# Patient Record
Sex: MALE | HISPANIC OR LATINO
[De-identification: names, ages, dates, MRNs, and addresses within clinical notes are randomized per-mention and may not be internally consistent; named-entity substitution may affect disease eponyms.]

---

## 2019-08-09 ENCOUNTER — TRANSCRIPTION ENCOUNTER (OUTPATIENT)
Age: 34
End: 2019-08-09

## 2019-12-14 ENCOUNTER — EMERGENCY (EMERGENCY)
Facility: HOSPITAL | Age: 34
LOS: 0 days | Discharge: HOME | End: 2019-12-14
Attending: EMERGENCY MEDICINE
Payer: SELF-PAY

## 2019-12-14 VITALS
SYSTOLIC BLOOD PRESSURE: 120 MMHG | TEMPERATURE: 98 F | OXYGEN SATURATION: 100 % | DIASTOLIC BLOOD PRESSURE: 60 MMHG | HEART RATE: 62 BPM | RESPIRATION RATE: 18 BRPM

## 2019-12-14 VITALS — WEIGHT: 160.06 LBS | HEIGHT: 70 IN

## 2019-12-14 DIAGNOSIS — Z41.9 ENCOUNTER FOR PROCEDURE FOR PURPOSES OTHER THAN REMEDYING HEALTH STATE, UNSPECIFIED: Chronic | ICD-10-CM

## 2019-12-14 DIAGNOSIS — K29.00 ACUTE GASTRITIS WITHOUT BLEEDING: ICD-10-CM

## 2019-12-14 DIAGNOSIS — R10.10 UPPER ABDOMINAL PAIN, UNSPECIFIED: ICD-10-CM

## 2019-12-14 DIAGNOSIS — R11.2 NAUSEA WITH VOMITING, UNSPECIFIED: ICD-10-CM

## 2019-12-14 DIAGNOSIS — Z98.89 OTHER SPECIFIED POSTPROCEDURAL STATES: Chronic | ICD-10-CM

## 2019-12-14 LAB
ALBUMIN SERPL ELPH-MCNC: 4.1 G/DL — SIGNIFICANT CHANGE UP (ref 3.3–5)
ALP SERPL-CCNC: 98 U/L — SIGNIFICANT CHANGE UP (ref 40–120)
ALT FLD-CCNC: 34 U/L — SIGNIFICANT CHANGE UP (ref 12–78)
ANION GAP SERPL CALC-SCNC: 4 MMOL/L — LOW (ref 5–17)
APPEARANCE UR: CLEAR — SIGNIFICANT CHANGE UP
AST SERPL-CCNC: 36 U/L — SIGNIFICANT CHANGE UP (ref 15–37)
BASOPHILS # BLD AUTO: 0.06 K/UL — SIGNIFICANT CHANGE UP (ref 0–0.2)
BASOPHILS NFR BLD AUTO: 1 % — SIGNIFICANT CHANGE UP (ref 0–2)
BILIRUB SERPL-MCNC: 0.6 MG/DL — SIGNIFICANT CHANGE UP (ref 0.2–1.2)
BILIRUB UR-MCNC: NEGATIVE — SIGNIFICANT CHANGE UP
BUN SERPL-MCNC: 13 MG/DL — SIGNIFICANT CHANGE UP (ref 7–23)
CALCIUM SERPL-MCNC: 9.3 MG/DL — SIGNIFICANT CHANGE UP (ref 8.5–10.1)
CHLORIDE SERPL-SCNC: 110 MMOL/L — HIGH (ref 96–108)
CO2 SERPL-SCNC: 26 MMOL/L — SIGNIFICANT CHANGE UP (ref 22–31)
COLOR SPEC: YELLOW — SIGNIFICANT CHANGE UP
CREAT SERPL-MCNC: 1.17 MG/DL — SIGNIFICANT CHANGE UP (ref 0.5–1.3)
DIFF PNL FLD: NEGATIVE — SIGNIFICANT CHANGE UP
EOSINOPHIL # BLD AUTO: 0.09 K/UL — SIGNIFICANT CHANGE UP (ref 0–0.5)
EOSINOPHIL NFR BLD AUTO: 1.5 % — SIGNIFICANT CHANGE UP (ref 0–6)
GLUCOSE SERPL-MCNC: 103 MG/DL — HIGH (ref 70–99)
GLUCOSE UR QL: NEGATIVE MG/DL — SIGNIFICANT CHANGE UP
HCT VFR BLD CALC: 46.9 % — SIGNIFICANT CHANGE UP (ref 39–50)
HGB BLD-MCNC: 16.2 G/DL — SIGNIFICANT CHANGE UP (ref 13–17)
IMM GRANULOCYTES NFR BLD AUTO: 0.2 % — SIGNIFICANT CHANGE UP (ref 0–1.5)
KETONES UR-MCNC: NEGATIVE — SIGNIFICANT CHANGE UP
LEUKOCYTE ESTERASE UR-ACNC: NEGATIVE — SIGNIFICANT CHANGE UP
LIDOCAIN IGE QN: 192 U/L — SIGNIFICANT CHANGE UP (ref 73–393)
LYMPHOCYTES # BLD AUTO: 2.5 K/UL — SIGNIFICANT CHANGE UP (ref 1–3.3)
LYMPHOCYTES # BLD AUTO: 41.7 % — SIGNIFICANT CHANGE UP (ref 13–44)
MCHC RBC-ENTMCNC: 29.6 PG — SIGNIFICANT CHANGE UP (ref 27–34)
MCHC RBC-ENTMCNC: 34.5 GM/DL — SIGNIFICANT CHANGE UP (ref 32–36)
MCV RBC AUTO: 85.7 FL — SIGNIFICANT CHANGE UP (ref 80–100)
MONOCYTES # BLD AUTO: 0.48 K/UL — SIGNIFICANT CHANGE UP (ref 0–0.9)
MONOCYTES NFR BLD AUTO: 8 % — SIGNIFICANT CHANGE UP (ref 2–14)
NEUTROPHILS # BLD AUTO: 2.86 K/UL — SIGNIFICANT CHANGE UP (ref 1.8–7.4)
NEUTROPHILS NFR BLD AUTO: 47.6 % — SIGNIFICANT CHANGE UP (ref 43–77)
NITRITE UR-MCNC: NEGATIVE — SIGNIFICANT CHANGE UP
PH UR: 6 — SIGNIFICANT CHANGE UP (ref 5–8)
PLATELET # BLD AUTO: 225 K/UL — SIGNIFICANT CHANGE UP (ref 150–400)
POTASSIUM SERPL-MCNC: 4 MMOL/L — SIGNIFICANT CHANGE UP (ref 3.5–5.3)
POTASSIUM SERPL-SCNC: 4 MMOL/L — SIGNIFICANT CHANGE UP (ref 3.5–5.3)
PROT SERPL-MCNC: 7.4 GM/DL — SIGNIFICANT CHANGE UP (ref 6–8.3)
PROT UR-MCNC: NEGATIVE MG/DL — SIGNIFICANT CHANGE UP
RBC # BLD: 5.47 M/UL — SIGNIFICANT CHANGE UP (ref 4.2–5.8)
RBC # FLD: 12.8 % — SIGNIFICANT CHANGE UP (ref 10.3–14.5)
SODIUM SERPL-SCNC: 140 MMOL/L — SIGNIFICANT CHANGE UP (ref 135–145)
SP GR SPEC: 1.01 — SIGNIFICANT CHANGE UP (ref 1.01–1.02)
UROBILINOGEN FLD QL: NEGATIVE MG/DL — SIGNIFICANT CHANGE UP
WBC # BLD: 6 K/UL — SIGNIFICANT CHANGE UP (ref 3.8–10.5)
WBC # FLD AUTO: 6 K/UL — SIGNIFICANT CHANGE UP (ref 3.8–10.5)

## 2019-12-14 PROCEDURE — 74177 CT ABD & PELVIS W/CONTRAST: CPT

## 2019-12-14 PROCEDURE — 83690 ASSAY OF LIPASE: CPT

## 2019-12-14 PROCEDURE — 81003 URINALYSIS AUTO W/O SCOPE: CPT

## 2019-12-14 PROCEDURE — 99284 EMERGENCY DEPT VISIT MOD MDM: CPT

## 2019-12-14 PROCEDURE — 96375 TX/PRO/DX INJ NEW DRUG ADDON: CPT

## 2019-12-14 PROCEDURE — 74177 CT ABD & PELVIS W/CONTRAST: CPT | Mod: 26

## 2019-12-14 PROCEDURE — 96374 THER/PROPH/DIAG INJ IV PUSH: CPT | Mod: XU

## 2019-12-14 PROCEDURE — 76705 ECHO EXAM OF ABDOMEN: CPT

## 2019-12-14 PROCEDURE — 80053 COMPREHEN METABOLIC PANEL: CPT

## 2019-12-14 PROCEDURE — 85025 COMPLETE CBC W/AUTO DIFF WBC: CPT

## 2019-12-14 PROCEDURE — 99053 MED SERV 10PM-8AM 24 HR FAC: CPT

## 2019-12-14 PROCEDURE — 76705 ECHO EXAM OF ABDOMEN: CPT | Mod: 26

## 2019-12-14 PROCEDURE — 99284 EMERGENCY DEPT VISIT MOD MDM: CPT | Mod: 25

## 2019-12-14 PROCEDURE — 36415 COLL VENOUS BLD VENIPUNCTURE: CPT

## 2019-12-14 RX ORDER — ONDANSETRON 8 MG/1
4 TABLET, FILM COATED ORAL ONCE
Refills: 0 | Status: COMPLETED | OUTPATIENT
Start: 2019-12-14 | End: 2019-12-14

## 2019-12-14 RX ORDER — FAMOTIDINE 10 MG/ML
20 INJECTION INTRAVENOUS ONCE
Refills: 0 | Status: COMPLETED | OUTPATIENT
Start: 2019-12-14 | End: 2019-12-14

## 2019-12-14 RX ORDER — MORPHINE SULFATE 50 MG/1
4 CAPSULE, EXTENDED RELEASE ORAL ONCE
Refills: 0 | Status: DISCONTINUED | OUTPATIENT
Start: 2019-12-14 | End: 2019-12-14

## 2019-12-14 RX ADMIN — ONDANSETRON 4 MILLIGRAM(S): 8 TABLET, FILM COATED ORAL at 01:28

## 2019-12-14 RX ADMIN — MORPHINE SULFATE 4 MILLIGRAM(S): 50 CAPSULE, EXTENDED RELEASE ORAL at 01:28

## 2019-12-14 RX ADMIN — FAMOTIDINE 20 MILLIGRAM(S): 10 INJECTION INTRAVENOUS at 01:28

## 2019-12-14 NOTE — ED PROVIDER NOTE - NSFOLLOWUPCLINICS_GEN_ALL_ED_FT
A Gastroenterologist  Gastroenterology  .  [place]   Phone:   Fax:   Follow Up Time:     [facility]  [address] [place] [address]  Phone: [phone]  Fax:   Follow Up Time:

## 2019-12-14 NOTE — ED PROVIDER NOTE - PSH
Elective surgery  Two previous eye surgeries for right eye trama , and right eye infection.  S/P appy

## 2019-12-14 NOTE — ED PROVIDER NOTE - PATIENT PORTAL LINK FT
You can access the FollowMyHealth Patient Portal offered by [facility] by registering at the following website: [email or web address]. By joining [facility]’s FollowMyHealth portal, you will also be able to view your health information using other applications (apps) compatible with our system.

## 2019-12-14 NOTE — ED ADULT NURSE NOTE - OBJECTIVE STATEMENT
35 y/o male awake alert and oriented x4 presents to ED c/o generalized abd pain x 1 week. + nausea. Denies vomiting, diarrhea, fever/chills, urinary sx, recent travel or sick contact. Took tylenol with mild relief.

## 2019-12-14 NOTE — ED PROVIDER NOTE - NSFOLLOWUPINSTRUCTIONS_ED_ALL_ED_FT
Bedside and Verbal shift change report given to Allen Modi. Report included the following information SBAR, Kardex, MAR, Accordion and Recent Results. Log Out.    [employer]® CareNotes®     :  [facility]             GASTRITIS - General Information     Gastritis    LO QUE NECESITA SABER:    ¿Qué es la gastritis?La gastritis es carmella inflamación o irritación del revestimiento del estómago. Órganos abdominales         ¿Qué aumenta mi riesgo de presentar gastritis?    Infección provocada por bacterias, virus o parásitos      Analgésicos antiinflamatorios no esteroides, aspirina o medicamentos esteroides      Uso de productos con tabaco o alcohol      Traumatismo, abi carmella lesión en el estómago o el intestino      Enfermedades autoinmunes, abi diabetes, enfermedad de la tiroides o enfermedad de Crohn      Estrés      Ser mayor de 60 años      Consumo de drogas ilegales, abi la cocaína    ¿Cuáles son los signos y síntomas de la gastritis?    Dolor de estómago, ardor o dolor con la palpación      Sensación de llenura y opresión      Náuseas o vómitos      Falta de apetito o rápidamente se siente lleno mientras está comiendo      Mal aliento      Fatiga o más cansancio que de costumbre      Acidez estomacal    ¿Cómo se diagnostica la gastritis?Martínez médico le preguntará sobre myrna signos y síntomas y lo examinará. Es posible que usted necesite alguno de los siguientes:     Los análisis de sangrepueden utilizarse para detectar carmella infección, deshidratación o anemia (niveles bajos de glóbulos rojos).      Carmella muestra de materia fecalse puede analizar para detectar la presencia de fco o gérmenes que podrían estar causando la gastritis.      Carmella prueba del alientopodría mostrar si el H pylori es el causante de martínez gastritis. A usted le darán un líquido para valencia. Luego usted respira dentro de carmella bolsa. Martínez médico medirá la cantidad de dióxido de carbono en martínez aliento. La cantidad extra de dióxido de carbono puede significar que usted tiene carmella infección por H pylori.      Carmella endoscopiapuede utilizarse para buscar irritación o sangrado en el estómago. Martínez médico usará un endoscopio (tubo con carmella nga y cámara en el extremo) carlos el procedimiento. Se podría valencia carmella muestra del estómago para examinarlo.    ¿Cuál es el tratamiento para la gastritis?Myrna síntomas podrían desaparecer sin tratamiento. El tratamiento dependerá de lo que le está causando martínez gastritis. Martínez médico le podría recomendar cambios a los medicamentos que leilani. Los medicamentos podrían ser recetados para ayudar a tratar la infección bacteriana o para disminuir el ácido estomacal.    ¿Cómo puedo controlar o evitar la gastritis?    No fume.La nicotina y otras sustancias químicas de los cigarrillos y los cigarros pueden empeorar myrna síntomas y causar daño pulmonar. Pida información a martínez médico si usted actualmente fuma y necesita ayuda para dejar de fumar. Los cigarrillos electrónicos o el tabaco sin humo igualmente contienen nicotina. Consulte con martínez médico antes de utilizar estos productos.      No consuma alcohol.El alcohol puede evitar la cicatrización y empeorar la gastritis. Consulte con martínez médico si usted necesita ayuda para dejar de valencia alcohol.      No tome medicamentos TERRI o aspirina a menos que así se lo indiquen.Estos y otros medicamentos similares pueden causar irritación en el revestimiento del estómago. Si martínez médico lo autoriza a valencia medicamentos TERRI, tómelos con la comida.      No coma alimentos que le provocan irritación:Los alimentos abi las naranjas y la salsa pueden causar ardor o dolor. Consuma alimentos saludables y variados. Unos ejemplos incluyen las frutas (no las cítricas), verduras, productos lácteos descremados, legumbres, pan integral al igual que las pavan magras y pescado. Trate de comer porciones más pequeñas y valencia agua con myrna comidas. No coma nada al menos por 3 horas antes de acostarse.      Encuentre maneras de relajarse y reducir el estrés.El estrés puede aumentar el ácido estomacal y empeorar la gastritis. Las actividades abi el yoga, la meditación o el escuchar música pueden ayudarlo a relajarse. Pase tiempo con amigos, o pratima cosas que disfruta.    Llame al 911 en meena de presentar lo siguiente:    Tiene dolor de pecho o le falta el aire.        ¿Cuándo osmany buscar atención inmediata?    Usted vomita fco.      Usted tiene evacuaciones intestinales negras o con fco.      Usted tiene un fred dolor de estómago o de espalda.    ¿Cuándo osmany comunicarme con mi médico?    Tiene fiebre.      Usted tiene síntomas nuevos o estos empeoran, aun después del tratamiento.      Usted tiene preguntas o inquietudes acerca de martínez condición o cuidado.    ACUERDOS SOBRE MARTÍNEZ CUIDADO:    Usted tiene el derecho de ayudar a planear martínez cuidado. Aprenda todo lo que pueda sobre martínez condición y abi darle tratamiento. Discuta myrna opciones de tratamiento con myrna médicos para decidir el cuidado que usted desea recibir. Usted siempre tiene el derecho de rechazar el tratamiento.       © Copyright [employer] 2019       back to top                      © Copyright [employer] 2019

## 2019-12-14 NOTE — ED PROVIDER NOTE - GASTROINTESTINAL, MLM
Abdomen soft, nondistended; +epigastric and RUQ tenderness; +periumbilical tenderness; normal bowel sounds.

## 2019-12-14 NOTE — ED PROVIDER NOTE - OBJECTIVE STATEMENT
Pt. is a 35 yo M with a hx of appendectomy BIB family for upper abdominal pain for 1 week, with nausea, vomiting.  Pain worsened last night so he came to the ED.  Patient states pain and bloating and nausea mostly occurs after meals.  He denies fever, cough, back pain, leg pain.  Denies constipation, bloody or black stools.

## 2024-03-20 ENCOUNTER — NON-APPOINTMENT (OUTPATIENT)
Age: 39
End: 2024-03-20

## 2024-03-28 ENCOUNTER — APPOINTMENT (OUTPATIENT)
Dept: ORTHOPEDIC SURGERY | Facility: CLINIC | Age: 39
End: 2024-03-28
Payer: SELF-PAY

## 2024-03-28 VITALS
BODY MASS INDEX: 23.34 KG/M2 | DIASTOLIC BLOOD PRESSURE: 75 MMHG | SYSTOLIC BLOOD PRESSURE: 112 MMHG | HEIGHT: 70 IN | WEIGHT: 163 LBS | HEART RATE: 71 BPM

## 2024-03-28 DIAGNOSIS — M47.816 SPONDYLOSIS W/OUT MYELOPATHY OR RADICULOPATHY, LUMBAR REGION: ICD-10-CM

## 2024-03-28 PROCEDURE — 72100 X-RAY EXAM L-S SPINE 2/3 VWS: CPT

## 2024-03-28 PROCEDURE — 99203 OFFICE O/P NEW LOW 30 MIN: CPT

## 2024-03-28 RX ORDER — MELOXICAM 15 MG/1
15 TABLET ORAL
Qty: 30 | Refills: 0 | Status: ACTIVE | COMMUNITY
Start: 2024-03-28 | End: 1900-01-01

## 2024-03-28 NOTE — HISTORY OF PRESENT ILLNESS
[de-identified] : 38-year-old male presenting to the office today for evaluation of lower back pain.  Patient states approximately a week and a half ago he was walking out of his home when he slipped down stairs, impacting the lower back.  He states he was able to get up and continue with his daily activities.  However over the next couple of days his symptoms persisted.  He was seen at [facility] a week ago which time he was diagnosed with muscle strain and was given anti-inflammatories and muscle relaxers.  Patient presents today for concerns of continued pain.  His pain is in the lumbar paraspinal muscles.  No radiation to bilateral lower extremities.  No numbness tingling bilateral lower extremities.  States naproxen and cyclobenzaprine have not been very helpful for symptoms.  No bowel / bladder incontinence. No saddle anesthesia.  BRITNI questionnaire is negative. There is no ataxia or other abnormal gait. Patient is not falling more than usual and does not have any decrease in dexterity.

## 2024-03-28 NOTE — PHYSICAL EXAM
[de-identified] : CONSTITUTIONAL: The patient is a very pleasant individual who is well-nourished and who appears stated age. PSYCHIATRIC: The patient is alert and oriented X 3 and in no apparent distress, and participates with orthopedic evaluation well. HEAD: Atraumatic and is nonsyndromic in appearance. EENT: No visible thyromegaly, EOMI. RESPIRATORY: Respiratory rate is regular, not dyspneic on examination. LYMPHATICS: There is no inguinal lymphadenopathy INTEGUMENTARY: Skin is clean, dry, and intact about the bilateral lower extremities and lumbar spine. VASCULAR: There is brisk capillary refill about the bilateral lower extremities. NEUROLOGIC: There are no pathologic reflexes. There is no decrease in sensation of the bilateral lower extremities on Wartenberg pinwheel examination. Deep tendon reflexes are well maintained at 2+/4 of the bilateral lower extremities and are symmetric. MUSCULOSKELETAL: There is no visible muscular atrophy. Manual motor strength is well maintained in the bilateral lower extremities. Range of motion of lumbar spine is well maintained. The patient ambulates in a non-myelopathic manner. Negative tension sign and straight leg raise bilaterally. Quad extension, ankle dorsiflexion, EHL, plantar flexion, and ankle eversion are well preserved. Normal secondary orthopaedic exam of bilateral hips, greater trochanteric area, knees and ankles.  Tenderness to palpation along mid to lower lumbar paraspinal muscles.  Mechanically oriented back pain.  [de-identified] : X-ray 2 views of the lumbar spine obtained in the office today 3/20/2024 demonstrated no acute osseous abnormalities.  Maintenance of lumbar lordosis.

## 2024-03-28 NOTE — DISCUSSION/SUMMARY
[Medication Risks Reviewed] : Medication risks reviewed [de-identified] : 38 year old male presents with symptoms suggestive of lumbar strain status post fall. 25 minutes was spent reviewing the x-rays as well as discussing with the patient their clinical presentation, diagnosis and providing education. Conservative treatment was discussed with the patient at length. Anticipatory guidance regarding disease process, avoidance of acute exacerbation this was discussed at length and all patients commenting concerns were answered to the patient's satisfaction.  Thankfully no fracture/osseous abnormalities were noted on today's x-rays. The patient was given home exercises as approved by [employer] and directed toward this particular process. Intermittent use of acetaminophen 500 mg 2 tablets t.i.d. p.r.n. mild to moderate pain, meloxicam 15 mg once a day.  Severe pain with food or milk if there is no medical contraindication was also discussed. Home exercise including stretching on a daily basis for 20-30 minutes was recommended. Heat, ice, topical were discussed as needed.  We also discussed trigger point injection in the office today with the patient choosing to defer at this time.  The patient will followup in 6-8 weeks at which point in time if symptoms continue we will order MRI studies to guide treatment plan including possible injection therapy with pain management versus surgical option. All questions and concerns were addressed with the patient and they are in agreement with this plan.  This note was generated using dragon medical dictation software. A reasonable effort has been made for proofreading its contents, but typos may still remain. If there are any questions or points of clarification needed please notify my office.

## 2024-11-15 NOTE — ED ADULT TRIAGE NOTE - NS ED NURSE DIRECT TO ROOM YN
Pt arrived to ED via CFD with ACLS in process. Per EMS they were dispatched for c/o SOB while in HD waiting room. When engine arrived to patient they noted agonal respirations and no pulse, CPR initiated. EMS reports ROSC X2 but lost pulse prior to arrival. Total of 2 epi given by EMS  On arrival to ED pt was transported to Room 2 where ACLS protocol continued   
No